# Patient Record
Sex: MALE | Race: WHITE | NOT HISPANIC OR LATINO | Employment: STUDENT | URBAN - METROPOLITAN AREA
[De-identification: names, ages, dates, MRNs, and addresses within clinical notes are randomized per-mention and may not be internally consistent; named-entity substitution may affect disease eponyms.]

---

## 2023-10-06 DIAGNOSIS — F41.9 ANXIETY DISORDER, UNSPECIFIED: ICD-10-CM

## 2023-10-06 DIAGNOSIS — F32.9 MAJOR DEPRESSIVE DISORDER, SINGLE EPISODE, UNSPECIFIED: ICD-10-CM

## 2023-10-11 DIAGNOSIS — F41.9 ANXIETY: Primary | ICD-10-CM

## 2023-10-11 RX ORDER — BUSPIRONE HYDROCHLORIDE 5 MG/1
TABLET ORAL
Qty: 60 TABLET | Refills: 1 | Status: SHIPPED | OUTPATIENT
Start: 2023-10-11

## 2023-10-11 RX ORDER — ESCITALOPRAM OXALATE 20 MG/1
20 TABLET ORAL DAILY
Qty: 30 TABLET | Refills: 1 | Status: SHIPPED | OUTPATIENT
Start: 2023-10-11 | End: 2023-12-10

## 2023-10-24 RX ORDER — ESCITALOPRAM OXALATE 20 MG/1
20 TABLET ORAL DAILY
Qty: 30 TABLET | Refills: 5 | OUTPATIENT
Start: 2023-10-24

## 2023-10-24 RX ORDER — BUSPIRONE HYDROCHLORIDE 5 MG/1
5 TABLET ORAL 2 TIMES DAILY
Qty: 60 TABLET | Refills: 5 | OUTPATIENT
Start: 2023-10-24

## 2023-11-22 DIAGNOSIS — F41.9 ANXIETY: ICD-10-CM

## 2023-11-22 RX ORDER — BUSPIRONE HYDROCHLORIDE 5 MG/1
TABLET ORAL
Qty: 180 TABLET | Refills: 1 | OUTPATIENT
Start: 2023-11-22

## 2023-11-22 NOTE — TELEPHONE ENCOUNTER
Last appointment was in March with requested follow-up in 6 months, so needs appointment before authorizing refills

## 2023-12-22 DIAGNOSIS — F41.9 ANXIETY: ICD-10-CM

## 2023-12-22 RX ORDER — BUSPIRONE HYDROCHLORIDE 5 MG/1
TABLET ORAL
Qty: 60 TABLET | Refills: 1 | OUTPATIENT
Start: 2023-12-22

## 2023-12-22 RX ORDER — ESCITALOPRAM OXALATE 20 MG/1
20 TABLET ORAL DAILY
Qty: 30 TABLET | Refills: 1 | OUTPATIENT
Start: 2023-12-22

## 2024-01-26 ENCOUNTER — TELEMEDICINE (OUTPATIENT)
Dept: BEHAVIORAL HEALTH | Facility: CLINIC | Age: 24
End: 2024-01-26
Payer: COMMERCIAL

## 2024-01-26 DIAGNOSIS — F33.1 MAJOR DEPRESSIVE DISORDER, RECURRENT, MODERATE (MULTI): Primary | ICD-10-CM

## 2024-01-26 PROCEDURE — 99214 OFFICE O/P EST MOD 30 MIN: CPT | Performed by: PSYCHIATRY & NEUROLOGY

## 2024-01-26 RX ORDER — DULOXETIN HYDROCHLORIDE 30 MG/1
30 CAPSULE, DELAYED RELEASE ORAL DAILY
Qty: 30 CAPSULE | Refills: 3 | Status: SHIPPED | OUTPATIENT
Start: 2024-01-26 | End: 2024-02-19

## 2024-01-28 NOTE — PROGRESS NOTES
"Virtual appointment with patient.  Consent obtained for this platform and identification verified.  He was located in [place].      Last seen 3/2023    He states he has been \"not good\".  He has put 2 weeks notice in to conclude his current job as .  Contributing to that decision was \"attendance issues\" and some concerns from employer about falsifying work hours.       He does not feel medication regimen of Escitalopram 20 mg daily and Buspirone 5 mg twice a day is helping.       He reports ongoing anxiety described as \"what if\" thinking.    He also reports depression over the last 6 months accompanied by feelings of hopelessness.  He denies suicidal ideation.  He reports 2 weeks ago incident of non-suicidal self-harm of cutting leg.  Along with feeling depressed he has been sleeping more, his appetite is increased, energy level is lower.       No adin or hallucinations.       Alcohol use increased over the last quarter of 2023.  He reports \"sober for one month\" over the holidays, then started drinking again and most recently has not used alcohol over the last 2 weeks.   He denies seizures or DT's.   He is talking to therapist Morelia Millan    Wedding plans remain for 8/2024.  Family stressor is father with medical problems and intermittently homeless.     MSE:  Normal dress and grooming.  Speech normal tone, rate, quality.  Depressed/anxious mood.  Normal range of affect.  Denies suicidal or homicidal ideation.   No agitation.  Alert and oriented X 4.   No adin or hallucinations.   Judgment and insight fair    Dx:  Major Depressive Disorder, recurrent, moderate         Anxiety Disorder    Plan:    Discontinue Escitalopram 20 mg daily (has been off the last couple of days).      Taper off Buspirone    Begin Duloxetine 30 mg every morning.   Consent obtained after review of risks and benefits including boxed warning.  Rx for 30 mg #30 with 3 refills sent to [facility] in [place]    Therapy " ongoing    ER if safety matters arise    Encouraged AA meeting attendance

## 2024-02-17 DIAGNOSIS — F33.1 MAJOR DEPRESSIVE DISORDER, RECURRENT, MODERATE (MULTI): ICD-10-CM

## 2024-02-19 RX ORDER — DULOXETIN HYDROCHLORIDE 30 MG/1
30 CAPSULE, DELAYED RELEASE ORAL DAILY
Qty: 90 CAPSULE | Refills: 1 | Status: SHIPPED | OUTPATIENT
Start: 2024-02-19 | End: 2024-08-17

## 2024-02-21 ENCOUNTER — APPOINTMENT (OUTPATIENT)
Dept: PRIMARY CARE | Facility: CLINIC | Age: 24
End: 2024-02-21
Payer: COMMERCIAL

## 2024-02-23 ENCOUNTER — TELEMEDICINE (OUTPATIENT)
Dept: BEHAVIORAL HEALTH | Facility: CLINIC | Age: 24
End: 2024-02-23
Payer: COMMERCIAL

## 2024-02-23 DIAGNOSIS — F41.9 ANXIETY: Primary | ICD-10-CM

## 2024-02-23 DIAGNOSIS — F33.1 MAJOR DEPRESSIVE DISORDER, RECURRENT EPISODE, MODERATE (MULTI): ICD-10-CM

## 2024-02-23 PROCEDURE — 99213 OFFICE O/P EST LOW 20 MIN: CPT | Performed by: PSYCHIATRY & NEUROLOGY

## 2024-02-23 RX ORDER — HYDROXYZINE PAMOATE 25 MG/1
CAPSULE ORAL
Qty: 30 CAPSULE | Refills: 0 | Status: SHIPPED | OUTPATIENT
Start: 2024-02-23

## 2024-02-24 NOTE — PROGRESS NOTES
"Virtual appointment with patient.  Consent obtained for this platform and identification verified.  He was located in parked car in [place].      He reports improved mood since last appointment 1/26.      He has been adherent to Duloxetine 30 mg every morning, denies adverse effects, and report it is helping with mood.      He denies feeling depressed.  Still experiences brief periods of anxiety such as on Sundays which he attributes in part to not being very social.    He adds when anxious \"it doesn't ruin my day anymore\"    He denies suicidal or homicidal ideation.  No aggression or self-harm.    No adin or hallucinations.      Last alcohol was ~6 weeks ago.  Denies substance use.       Has new job with [employer] company which is going well.  He has started to go back to the gym more regularly.     MSE:  Normal dress and grooming.  Thought process goal-directed.  Euthymic mood, full range of affect.  Speech normal tone, rate, quality.   Alert and oriented X 4.  Future-oriented.  No suicidal or homicidal ideation.   Judgment and insight good    Dx:  Major Depressive Disorder, recurrent, moderate         Anxiety Disorder    Plan:    Continue Duloxetine 30 mg every morning.  Ongoing consent obtained. Has sufficient supply.      Have Hydroxyzine 25 mg available as PRN for heightened anxiety.  Consent obtained after review of risks and benefits.  Rx for 25 mg #30 sent to [facility] in [place]    Therapy    Discussed pursuing more social groups/activities    Follow-up in 3 months, call as needed in the interim.    "

## 2024-03-02 DIAGNOSIS — F41.9 ANXIETY: ICD-10-CM

## 2024-03-04 RX ORDER — HYDROXYZINE PAMOATE 25 MG/1
CAPSULE ORAL
Qty: 180 CAPSULE | Refills: 1 | OUTPATIENT
Start: 2024-03-04

## 2024-08-19 ENCOUNTER — APPOINTMENT (OUTPATIENT)
Dept: BEHAVIORAL HEALTH | Facility: CLINIC | Age: 24
End: 2024-08-19
Payer: COMMERCIAL

## 2024-08-19 DIAGNOSIS — F33.1 MAJOR DEPRESSIVE DISORDER, RECURRENT EPISODE, MODERATE (MULTI): Primary | ICD-10-CM

## 2024-08-19 PROCEDURE — 99214 OFFICE O/P EST MOD 30 MIN: CPT | Performed by: PSYCHIATRY & NEUROLOGY

## 2024-08-19 RX ORDER — DULOXETIN HYDROCHLORIDE 30 MG/1
30 CAPSULE, DELAYED RELEASE ORAL DAILY
Qty: 30 CAPSULE | Refills: 3 | Status: SHIPPED | OUTPATIENT
Start: 2024-08-19 | End: 2024-12-17

## 2024-08-20 NOTE — PROGRESS NOTES
"Virtual appointment with patient.  Consent obtained for this platform and identification verified.  He was located in [place] where he works.      Last seen 2/2024.      He states his fiancee broke up with him in April and thereafter he stopped the medication Duloxetine because he explained \"wanted to feel natural emotions\" from the break up.   He also states at that time he stopped going to therapy.      He states the \"lows\" have started to reduce in frequency over the last month, now 1-2 days/week.      He denies suicidal ideation since May but reports in May considered hanging himself.     He has started to exercise regularly again after not doing as often after the break-up and has developed healthier eating patterns which has led to him feeling better physically.   He indicates he has gone from ~190 lbs to ~170 lbs in the last couple of months.      No adin or hallucinations.    Denies substance use.    Alcohol use 1-2 times/week.      He states mood better on work days compared to weekends because of being occupied.   He indicates he has family support system.      MSE:  Normal dress and grooming.   Thought process goal-directed.   Speech normal tone, rate, quality.  Mood neutral to sad when talking about the break-up.  He denies suicidal or homicidal ideation.   He is future-oriented.  Alert and oriented X 4.   Judgment and insight fair    Dx:  Major Depressive Disorder, recurrent, moderate  Anxiety Disorder    Plan:    Resume Duloxetine 30 mg every morning.  Consent obtained.  Rx for 30 mg #30 with 3 refills sent to [facility] in [place]    Discussed and recommended IOP, however patient not interested at this time    He agrees with returning to therapy and prefers male therapist.   We discussed utilizing psychology today site to identify someone close to where he lives.      Follow-up 9/24 at 4:00, call as needed in the interim    Reviewed use of ER if safety matters arise.     "

## 2024-09-02 DIAGNOSIS — F33.1 MAJOR DEPRESSIVE DISORDER, RECURRENT EPISODE, MODERATE (MULTI): ICD-10-CM

## 2024-09-02 RX ORDER — DULOXETIN HYDROCHLORIDE 30 MG/1
30 CAPSULE, DELAYED RELEASE ORAL DAILY
Qty: 90 CAPSULE | Refills: 1 | Status: SHIPPED | OUTPATIENT
Start: 2024-09-02 | End: 2025-03-01

## 2024-09-24 ENCOUNTER — APPOINTMENT (OUTPATIENT)
Dept: BEHAVIORAL HEALTH | Facility: CLINIC | Age: 24
End: 2024-09-24
Payer: COMMERCIAL

## 2024-09-24 DIAGNOSIS — F33.1 MAJOR DEPRESSIVE DISORDER, RECURRENT EPISODE, MODERATE: Primary | ICD-10-CM

## 2024-09-24 PROCEDURE — 99213 OFFICE O/P EST LOW 20 MIN: CPT | Performed by: PSYCHIATRY & NEUROLOGY

## 2024-09-26 NOTE — PROGRESS NOTES
Virtual appointment with patient.  Consent obtained for this platform and identification verified.    He was located in [place]    He reports mood has improved since last appointment.      He has been back on Duloxetine for the last week.  He denies adverse effects.      He is also back to getting regular exercise which has helped with mood.      He denies suicidal or homicidal ideation.     No adin or hallucinations.    He reports alcohol use more nights than not, we reviewed risks.  He indicates he also will be addressing in therapy    Work functioning has been good.    Typically sleeps through the night and appetite fair/good.      Denies illicit drug use.      MSE:  Normal dress and grooming.   Speech normal tone, rate, quality.   Euthymic mood, full range of affect.  Denies suicidal or homicidal ideation.   Alert and oriented X 4.  Future-oriented.  Judgment and insight fair.      Dx:  Major Depressive Disorder, recurrent, moderate  Anxiety Disorder    Plan:    Continue Duloxetine 30 mg every morning.   Ongoing consent obtained.  Has sufficient supply with refills    Therapy    Follow-up 6-8 weeks, call as needed in the interim

## 2024-11-07 ENCOUNTER — APPOINTMENT (OUTPATIENT)
Dept: BEHAVIORAL HEALTH | Facility: CLINIC | Age: 24
End: 2024-11-07
Payer: COMMERCIAL

## 2024-11-27 ENCOUNTER — OFFICE VISIT (OUTPATIENT)
Dept: URGENT CARE | Age: 24
End: 2024-11-27
Payer: COMMERCIAL

## 2024-11-27 VITALS
BODY MASS INDEX: 20.18 KG/M2 | TEMPERATURE: 96.7 F | DIASTOLIC BLOOD PRESSURE: 79 MMHG | WEIGHT: 149 LBS | SYSTOLIC BLOOD PRESSURE: 133 MMHG | OXYGEN SATURATION: 97 % | HEART RATE: 66 BPM | HEIGHT: 72 IN

## 2024-11-27 DIAGNOSIS — R07.9 CHEST PAIN, UNSPECIFIED TYPE: Primary | ICD-10-CM

## 2024-11-27 NOTE — PROGRESS NOTES
"Subjective   Patient ID: Montrell Moore is a 24 y.o. male. They present today with a chief complaint of Chest Pain (Pain in chest when breathing every so often. Just a burning feeling. Not all the time).    History of Present Illness  Montrell Moore is a 24 y.o. male who presents to the clinic for intermittent chest pain. Pt states the pain is located on the right and left sife of hte chest. Not present at rest, but worse when he moves and takes a big breath in . Pt had pectus excavatum surgery in 2018. Pt states he thinks he might have a \"leaky\" valve in his hear. Pt states the pain does not radiate anywhere. . Pt denies any chest pain, sob, N/V at this time in clinic.             Past Medical History  Allergies as of 11/27/2024    (No Known Allergies)       (Not in a hospital admission)       Past Medical History:   Diagnosis Date    Palpitations 04/07/2016    Heart palpitations    Pectus excavatum 10/10/2016    Pectus excavatum    Personal history of other diseases of male genital organs     History of undescended testicle       Past Surgical History:   Procedure Laterality Date    OTHER SURGICAL HISTORY  05/22/2013    Surgery Testis    OTHER SURGICAL HISTORY  04/18/2018    Repair Of Pectus Excavatum            Review of Systems  Review of Systems   All other systems reviewed and are negative.                                 Objective    Vitals:    11/27/24 1046   BP: 133/79   Pulse: 66   Temp: 35.9 °C (96.7 °F)   TempSrc: Temporal   SpO2: 97%   Weight: 67.6 kg (149 lb)   Height: 1.829 m (6')     No LMP for male patient.    Physical Exam  Constitutional:       Appearance: Normal appearance.   HENT:      Left Ear: Tympanic membrane normal.   Cardiovascular:      Pulses: Normal pulses.      Heart sounds: Normal heart sounds.   Pulmonary:      Effort: Pulmonary effort is normal.      Breath sounds: Normal breath sounds.   Neurological:      General: No focal deficit present.      Mental Status: He is alert and oriented to " person, place, and time. Mental status is at baseline.   Psychiatric:         Mood and Affect: Mood normal.         Behavior: Behavior normal.         Procedures    Point of Care Test & Imaging Results from this visit  No results found for this visit on 11/27/24.   No results found.    Diagnostic study results (if any) were reviewed by OSMAN Rodrigues.    Assessment/Plan   Allergies, medications, history, and pertinent labs/EKGs/Imaging reviewed by OSMAN Rodrigues.     Medical Decision Making  EKG reviewed with Dr. Richey. Due to pt hx, pt will be evaluated at the ER. Pt will travel via private car.     Orders and Diagnoses  Diagnoses and all orders for this visit:  Chest pain, unspecified type  -     ECG 12 lead      Medical Admin Record      Patient disposition: ED    Electronically signed by OSMAN Rodrigues  11:29 AM

## 2025-02-10 ENCOUNTER — APPOINTMENT (OUTPATIENT)
Dept: BEHAVIORAL HEALTH | Facility: CLINIC | Age: 25
End: 2025-02-10
Payer: COMMERCIAL

## 2025-02-10 DIAGNOSIS — F33.1 MAJOR DEPRESSIVE DISORDER, RECURRENT EPISODE, MODERATE: Primary | ICD-10-CM

## 2025-02-10 PROCEDURE — 99214 OFFICE O/P EST MOD 30 MIN: CPT | Performed by: PSYCHIATRY & NEUROLOGY

## 2025-02-10 NOTE — PROGRESS NOTES
"Virtual appointment with patient.  Consent obtained for this platform and identification verified.  He was located in [place]    He has not been taking Duloxetine 30 mg for several months but indicates he wants to be back on it.  Off Duloxetine he states he gets more frustrated with himself and experiences \"spikes of sadness\" during which he states he tends to stay in bed and experiences feelings of hopelessness.    He denies suicidal ideation  No aggression or self-harm.     He denies heightened anxiety    No adin or hallucinations    He states alcohol use in moderation and not interfering with functioning or relationships  Denies substance use    He is sleeping well.    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      He denies physical complaints    MSE:  Normal dress and grooming.  Thought process goal-directed.  Speech normal tone, rate, quality.  Neutral mood, full range of affect.  Denies suicidal or homicidal ideation.  Alert and oriented X 4.  Judgment and insight fair    Dx:  Major Depressive Disorder, recurrent, moderate  Anxiety Disorder    Plan:    Resume Duloxetine 30 mg every morning.  Contacted [facility] at [phone] and Rx from 9/2 remains available and is being prepared for pick-up    Patient in process of establishing with new therapist    Follow-up in 3 months, call as needed in the interim      "

## 2025-05-12 ENCOUNTER — APPOINTMENT (OUTPATIENT)
Dept: BEHAVIORAL HEALTH | Facility: CLINIC | Age: 25
End: 2025-05-12
Payer: COMMERCIAL

## 2025-05-12 DIAGNOSIS — F41.9 ANXIETY: ICD-10-CM

## 2025-05-12 DIAGNOSIS — F33.1 MAJOR DEPRESSIVE DISORDER, RECURRENT, MODERATE: Primary | ICD-10-CM

## 2025-05-12 PROCEDURE — 99214 OFFICE O/P EST MOD 30 MIN: CPT | Performed by: PSYCHIATRY & NEUROLOGY

## 2025-05-12 RX ORDER — HYDROXYZINE PAMOATE 25 MG/1
CAPSULE ORAL
Qty: 30 CAPSULE | Refills: 0 | Status: SHIPPED | OUTPATIENT
Start: 2025-05-12

## 2025-05-12 NOTE — PROGRESS NOTES
"Virtual appointment with patient.  Consent obtained for this platform and identification verified.  He was located in [place]    After last appointment he did not resume Duloxetine 30 mg.    He states lifestyle initiatives he has implemented over the last few months have helped with both physical and emotional well-being.  These include stopping smoking, reducing alcohol use, and regular exercise including biking and working out.      He states \"doing great, more enrgy, better attitude\"    He denies any sustained sadness.  He denies any hopeless feelings or suicidal ideation.    Sleeping and eating well.      No adin or hallucinations  Denies substance use    Work going well.      Occasional heightened anxiety for which he takes Hydroxyzine 25 mg but this has been an infrequent requirement.      He did not pursue therapy    MSE:  Normal dress and grooming.  Thought process goal-directed.  Euthymic mood, full range of affect.  Speech normal tone, rate, quality.   Denies suicidal or homicidal ideation.  Alert and oriented X 4.  Judgment and insight good    Dx:  Major Depressive Disorder, recurrent, moderate, in partial remission  Anxiety Disorder    Plan:    Remain off Duloxetine    Hydroxyzine 25 mg PRN renewed for #30 to [facility].      Follow-up 8/18 at 8:00, call as needed in the interim  "

## 2025-08-18 ENCOUNTER — APPOINTMENT (OUTPATIENT)
Dept: BEHAVIORAL HEALTH | Facility: CLINIC | Age: 25
End: 2025-08-18
Payer: COMMERCIAL

## 2025-08-18 DIAGNOSIS — F41.1 GENERALIZED ANXIETY DISORDER: Primary | ICD-10-CM

## 2025-08-18 PROCEDURE — 99214 OFFICE O/P EST MOD 30 MIN: CPT | Performed by: PSYCHIATRY & NEUROLOGY

## 2025-08-18 RX ORDER — SERTRALINE HYDROCHLORIDE 50 MG/1
50 TABLET, FILM COATED ORAL DAILY
Qty: 30 TABLET | Refills: 3 | Status: SHIPPED | OUTPATIENT
Start: 2025-08-18 | End: 2025-12-16

## 2025-10-13 ENCOUNTER — APPOINTMENT (OUTPATIENT)
Dept: BEHAVIORAL HEALTH | Facility: CLINIC | Age: 25
End: 2025-10-13
Payer: COMMERCIAL